# Patient Record
Sex: MALE | Race: BLACK OR AFRICAN AMERICAN | NOT HISPANIC OR LATINO | Employment: UNEMPLOYED | URBAN - METROPOLITAN AREA
[De-identification: names, ages, dates, MRNs, and addresses within clinical notes are randomized per-mention and may not be internally consistent; named-entity substitution may affect disease eponyms.]

---

## 2023-04-24 ENCOUNTER — HOSPITAL ENCOUNTER (EMERGENCY)
Facility: HOSPITAL | Age: 58
Discharge: HOME | End: 2023-04-24
Attending: EMERGENCY MEDICINE

## 2023-04-24 VITALS
TEMPERATURE: 99 F | BODY MASS INDEX: 25.18 KG/M2 | HEIGHT: 73 IN | OXYGEN SATURATION: 99 % | HEART RATE: 91 BPM | WEIGHT: 190 LBS | DIASTOLIC BLOOD PRESSURE: 85 MMHG | RESPIRATION RATE: 18 BRPM | SYSTOLIC BLOOD PRESSURE: 141 MMHG

## 2023-04-24 DIAGNOSIS — J02.0 STREP PHARYNGITIS: Primary | ICD-10-CM

## 2023-04-24 LAB — POC RAPID STREP A: POSITIVE

## 2023-04-24 PROCEDURE — 25000003 PHARM REV CODE 250: Mod: ER

## 2023-04-24 PROCEDURE — 96372 THER/PROPH/DIAG INJ SC/IM: CPT

## 2023-04-24 PROCEDURE — 99284 EMERGENCY DEPT VISIT MOD MDM: CPT | Mod: ER

## 2023-04-24 PROCEDURE — 63600175 PHARM REV CODE 636 W HCPCS: Mod: ER

## 2023-04-24 RX ORDER — DEXAMETHASONE SODIUM PHOSPHATE 4 MG/ML
4 INJECTION, SOLUTION INTRA-ARTICULAR; INTRALESIONAL; INTRAMUSCULAR; INTRAVENOUS; SOFT TISSUE
Status: COMPLETED | OUTPATIENT
Start: 2023-04-24 | End: 2023-04-24

## 2023-04-24 RX ORDER — IBUPROFEN 600 MG/1
600 TABLET ORAL EVERY 6 HOURS PRN
Qty: 20 TABLET | Refills: 0 | Status: SHIPPED | OUTPATIENT
Start: 2023-04-24

## 2023-04-24 RX ORDER — IBUPROFEN 600 MG/1
600 TABLET ORAL
Status: COMPLETED | OUTPATIENT
Start: 2023-04-24 | End: 2023-04-24

## 2023-04-24 RX ORDER — ACETAMINOPHEN 500 MG
500 TABLET ORAL EVERY 6 HOURS PRN
Qty: 28 TABLET | Refills: 0 | Status: SHIPPED | OUTPATIENT
Start: 2023-04-24 | End: 2023-05-01

## 2023-04-24 RX ORDER — PHENOL 1.4 %
AEROSOL, SPRAY (ML) MUCOUS MEMBRANE
Qty: 177 ML | Refills: 0 | Status: SHIPPED | OUTPATIENT
Start: 2023-04-24

## 2023-04-24 RX ORDER — PENICILLIN V POTASSIUM 250 MG/1
500 TABLET, FILM COATED ORAL
Status: COMPLETED | OUTPATIENT
Start: 2023-04-24 | End: 2023-04-24

## 2023-04-24 RX ORDER — PENICILLIN V POTASSIUM 500 MG/1
500 TABLET, FILM COATED ORAL 3 TIMES DAILY
Qty: 30 TABLET | Refills: 0 | Status: SHIPPED | OUTPATIENT
Start: 2023-04-24 | End: 2023-05-04

## 2023-04-24 RX ADMIN — PENICILLIN V POTASSIUM 500 MG: 250 TABLET, FILM COATED ORAL at 05:04

## 2023-04-24 RX ADMIN — IBUPROFEN 600 MG: 600 TABLET ORAL at 05:04

## 2023-04-24 RX ADMIN — DEXAMETHASONE SODIUM PHOSPHATE 4 MG: 4 INJECTION, SOLUTION INTRA-ARTICULAR; INTRALESIONAL; INTRAMUSCULAR; INTRAVENOUS; SOFT TISSUE at 05:04

## 2023-04-24 NOTE — ED PROVIDER NOTES
"Encounter Date: 4/24/2023    SCRIBE #1 NOTE: I, Nay Hinson, am scribing for, and in the presence of,  Henok Cerna PA-C. I have scribed the following portions of the note - Other sections scribed: HPI, ROS.     History     Chief Complaint   Patient presents with    Sore Throat     Sore throat x3 days with difficulty swallowing. Pt denies cough or congestion. Nyquil used with no relief.      This is a 57 y.o. male with no known PMHx who presents to the ED for a sore throat onset 3 days ago. He reports it is getting hard to swallow. Pt states "its hard to breathe at night". Pt reports attempting to treat his sore throat with Nyquil with no relief. Denies cough and congestion.    The history is provided by the patient. No  was used.   Review of patient's allergies indicates:  No Known Allergies  No past medical history on file.  No past surgical history on file.  No family history on file.  Social History     Tobacco Use    Smoking status: Never    Smokeless tobacco: Never   Substance Use Topics    Alcohol use: No    Drug use: No     Review of Systems   Constitutional:  Negative for diaphoresis, fatigue and unexpected weight change.   HENT:  Positive for sore throat. Negative for sinus pain.    Eyes:  Negative for pain, redness and visual disturbance.   Respiratory:  Negative for cough, chest tightness, shortness of breath and wheezing.    Cardiovascular:  Negative for chest pain and palpitations.   Gastrointestinal:  Negative for abdominal pain, blood in stool, diarrhea, nausea and vomiting.   Endocrine: Negative for polydipsia, polyphagia and polyuria.   Genitourinary:  Negative for dysuria, frequency and urgency.   Musculoskeletal:  Negative for arthralgias, back pain and myalgias.   Skin:  Negative for rash.   Allergic/Immunologic: Negative for environmental allergies.   Neurological:  Negative for dizziness, syncope and headaches.   Psychiatric/Behavioral:  Negative for suicidal ideas.  "     Physical Exam     Initial Vitals [04/24/23 1537]   BP Pulse Resp Temp SpO2   (!) 149/79 98 18 99.2 °F (37.3 °C) 99 %      MAP       --         Physical Exam    Nursing note and vitals reviewed.  Constitutional: Vital signs are normal. He appears well-developed and well-nourished. He is cooperative. He does not appear ill. No distress.   HENT:   Head: Normocephalic and atraumatic.   Right Ear: External ear normal.   Left Ear: External ear normal.   Nose: Nose normal.   Mouth/Throat: Uvula is midline. Mucous membranes are not dry. No dental abscesses or uvula swelling. Oropharyngeal exudate, posterior oropharyngeal edema and posterior oropharyngeal erythema present. No tonsillar abscesses.   Bilateral tonsils are swollen, erythematous, and with white exudates.  The right tonsil is swollen significantly more than the left tonsil.  There is no uvula deviation.  Airway is patent, patient is able to swallow and is managing secretions appropriately.   Eyes: Conjunctivae and EOM are normal.   Neck: Phonation normal.   Normal range of motion.  Cardiovascular:  Normal rate and regular rhythm.           No murmur heard.  Pulmonary/Chest: Effort normal. No respiratory distress.   Abdominal: Abdomen is flat. He exhibits no distension. There is no abdominal tenderness.   Musculoskeletal:      Cervical back: Normal range of motion.     Neurological: He is alert and oriented to person, place, and time. GCS eye subscore is 4. GCS verbal subscore is 5. GCS motor subscore is 6.   Skin: Skin is warm and dry. Capillary refill takes less than 2 seconds. No rash noted.       ED Course   Procedures  Labs Reviewed   POCT STREP A, RAPID - Abnormal; Notable for the following components:       Result Value    POC Rapid Strep A positive (*)     All other components within normal limits   POCT STREP A MOLECULAR          Imaging Results    None          Medications   penicillin v potassium tablet 500 mg (500 mg Oral Given 4/24/23 0994)    dexAMETHasone injection 4 mg (4 mg Intramuscular Given 4/24/23 1744)   ibuprofen tablet 600 mg (600 mg Oral Given 4/24/23 1744)     Medical Decision Making:   History:   Old Medical Records: I decided to obtain old medical records.  Initial Assessment:   57-year-old male presenting to the emergency department with chief complaint of sore throat.  Reports that it is becoming increasingly difficult to swallow secondary to pain and discomfort.  He has been able to eat and drink.  Denies drooling.  Denies fever.  On physical exam, clinically well-appearing and in no acute distress.  Respirations even and unlabored.  Managing secretions appropriately.  There is significant swelling, erythema, and exudates on bilateral tonsils.  Differential Diagnosis:   Differential diagnosis includes but is not limited to respiratory infections including strep pharyngitis, viral pharyngitis, peritonsillar abscess, retropharyngeal abscess, COVID, flu, bronchitis, rhinosinusitis, or pneumonia, or noninfectious processes such as asthma, COPD or seasonal allergies.   Clinical Tests:   Lab Tests: Ordered and Reviewed       <> Summary of Lab: Strep positive.  ED Management:  Patient presenting to the emergency department with a chief complaint of sore throat.  History and physical exam findings as above.  Patient tested positive for strep. Bilateral tonsils were erythematous, swollen, and had lacy white exudate.  This presentation is consistent with streptococcal pharyngitis.  The patient was given Tylenol in the emergency department. Patient noted some improvement in pain after this treatment.  First dose of oral antibiotics was provided in the emergency department.  The patient will be discharged home with a 10 day course of Penicillin-Vk, throat spray, and throat lozenges for both antimicrobial coverage and symptomatic relief.  The patient voiced their understanding that they need to take the antibiotic medications until all of the pills  are gone, even if symptoms improve or resolve.    Patient's right tonsil was significantly more swollen than the left tonsil, raising some mild concern for developing peritonsillar abscess.  However, the patient is managing secretions appropriately and is able to swallow.  No clear signs of current peritonsillar abscess, no punctum on erythematous and swollen tonsil.  With these findings, patient was safe to be discharged home and trialed with antibiotics rather than consulting ENT from the emergency department.  I did place an outpatient referral to ENT for management if this patient's symptoms do not improve.  Return precautions were discussed at length and in detail.    Return precautions were discussed, all patient questions were answered, and the patient was agreeable to the plan of care.  He was discharged home in stable condition and will follow up with his primary care provider or return to the emergency department if his symptoms worsen or do not improve.         Scribe Attestation:   Scribe #1: I performed the above scribed service and the documentation accurately describes the services I performed. I attest to the accuracy of the note.                 I, Henok Cerna PA-C, personally performed the services described in this documentation.  All medical record entries made by the scribe were at my direction and in my presence.  I have reviewed the chart and agree that the record reflects my personal performance and is accurate and complete.   Clinical Impression:   Final diagnoses:  [J02.0] Strep pharyngitis (Primary)        ED Disposition Condition    Discharge Stable          ED Prescriptions       Medication Sig Dispense Start Date End Date Auth. Provider    phenoL (CHLORASEPTIC THROAT SPRAY) 1.4 % SprA by Mucous Membrane route every 2 (two) hours as needed (Sore throat). 177 mL 4/24/2023 -- Henok Cerna PA-C    benzocaine-menthoL 6-10 mg lozenge Take 1 lozenge by mouth every 2 (two) hours as  needed. 36 tablet 4/24/2023 -- Henok Cerna PA-C    ibuprofen (ADVIL,MOTRIN) 600 MG tablet Take 1 tablet (600 mg total) by mouth every 6 (six) hours as needed for Pain. 20 tablet 4/24/2023 -- Henok Cerna PA-C    acetaminophen (TYLENOL) 500 MG tablet Take 1 tablet (500 mg total) by mouth every 6 (six) hours as needed. 28 tablet 4/24/2023 5/1/2023 Henok Cerna PA-C    penicillin v potassium (VEETID) 500 MG tablet Take 1 tablet (500 mg total) by mouth 3 (three) times daily. for 10 days 30 tablet 4/24/2023 5/4/2023 Henok Cerna PA-C          Follow-up Information       Follow up With Specialties Details Why Contact Info    [facility]  Schedule an appointment as soon as possible for a visit  As needed, If symptoms worsen [address] [place] [address]  [phone]      [facility] Emergency Medicine Go to  If symptoms worsen [address] [facility] [address] [phone]             Henok Cerna PA-C  04/24/23 1938

## 2023-04-24 NOTE — DISCHARGE INSTRUCTIONS

## 2023-12-06 ENCOUNTER — OFFICE VISIT (OUTPATIENT)
Dept: URGENT CARE | Facility: CLINIC | Age: 58
End: 2023-12-06

## 2023-12-06 VITALS
WEIGHT: 200 LBS | BODY MASS INDEX: 26.51 KG/M2 | SYSTOLIC BLOOD PRESSURE: 180 MMHG | TEMPERATURE: 99 F | RESPIRATION RATE: 16 BRPM | HEART RATE: 99 BPM | HEIGHT: 73 IN | DIASTOLIC BLOOD PRESSURE: 82 MMHG | OXYGEN SATURATION: 95 %

## 2023-12-06 DIAGNOSIS — N45.1 EPIDIDYMITIS: Primary | ICD-10-CM

## 2023-12-06 DIAGNOSIS — N50.89 TESTICULAR SWELLING: ICD-10-CM

## 2023-12-06 DIAGNOSIS — I10 HYPERTENSION, UNSPECIFIED TYPE: ICD-10-CM

## 2023-12-06 DIAGNOSIS — Z76.89 ENCOUNTER TO ESTABLISH CARE: ICD-10-CM

## 2023-12-06 PROCEDURE — 96372 PR INJECTION,THERAP/PROPH/DIAG2ST, IM OR SUBCUT: ICD-10-PCS | Mod: TIER,S$GLB,,

## 2023-12-06 PROCEDURE — 96372 THER/PROPH/DIAG INJ SC/IM: CPT | Mod: TIER,S$GLB,,

## 2023-12-06 PROCEDURE — 87591 N.GONORRHOEAE DNA AMP PROB: CPT

## 2023-12-06 PROCEDURE — 87491 CHLMYD TRACH DNA AMP PROBE: CPT

## 2023-12-06 PROCEDURE — 99203 OFFICE O/P NEW LOW 30 MIN: CPT | Mod: TIER,25,S$GLB,

## 2023-12-06 PROCEDURE — 99203 PR OFFICE/OUTPT VISIT, NEW, LEVL III, 30-44 MIN: ICD-10-PCS | Mod: TIER,25,S$GLB,

## 2023-12-06 RX ORDER — CEFTRIAXONE 500 MG/1
500 INJECTION, POWDER, FOR SOLUTION INTRAMUSCULAR; INTRAVENOUS
Status: COMPLETED | OUTPATIENT
Start: 2023-12-06 | End: 2023-12-06

## 2023-12-06 RX ORDER — DOXYCYCLINE 100 MG/1
100 CAPSULE ORAL 2 TIMES DAILY
Qty: 14 CAPSULE | Refills: 0 | Status: SHIPPED | OUTPATIENT
Start: 2023-12-06 | End: 2023-12-13

## 2023-12-06 RX ADMIN — CEFTRIAXONE 500 MG: 500 INJECTION, POWDER, FOR SOLUTION INTRAMUSCULAR; INTRAVENOUS at 02:12

## 2023-12-06 NOTE — PATIENT INSTRUCTIONS
Take oral antibiotic as directed for the next 7 days.  Take full dose or symptoms will not get better. Take with food and water to decrease GI upset. Try a probiotic or eat daily yogurt to maintain good gut and vaginal monty while on an antibiotic. Do not drink alcohol while taking an antibiotic.      Alternate tylenol and ibuprofen every 4 hours as needed for pain. Always take ibuprofen with food and water to avoid GI upset. Stop taking if you develop abdominal pain or blood in stool.     Monitor for worsening signs of infection such as redness, warmth, increase in pain, fevers, chills, body aches.     Follow up with urology. A referral was placed for you, call [phone] to schedule appointment.     Go to the ER if worsening signs/symptoms of infection occur.           Elevated BP  Check your BP twice per day and keep a log.      Try a DASH diet. I have attached information in your discharge paperwork to review. A Mediterranean diet will help to decrease inflammation and would help lower your BP. Increase water intake to help your body get rid of an increase in salt. Avoid alcohol as this can increase your BP. Increase aerobic activity to 3 times per week and 45 min at a time. Practice good sleep hygiene by sleeping in a dark, cool, comfortable bed. No tv/device use before bed or tv while sleeping. It is recommended to get 7-8 hours of uninterrupted sleep per night.     The recommended daily fluid intake for men is 3.7 liters (seven 16 oz bottles).    Follow up with a PCP for further management. A referral was placed for you, call [phone] to schedule appointment.     Go to the ER if your BP is consistently >160/100 and symptomatic with a severe headache, vomiting and unable to tolerate fluids, numbness/tingling/weakness to body, difficulty speaking, balance difficulty, confusion.          ([facility] recommendation)  The Mediterranean diet is based on plant-based foods and healthy fats.     You eat LOTS of  vegetables, fruits, beans, lentils, nuts, whole grains (wheat bread, brown rice) & extra virgin olive oil.   ---- These foods are high in fiber and antioxidants.   ---- These nutrients help reduce inflammation.   ---- Fiber helps regulate bowel movements.   ---- Antioxidants protect you against cancer.     Eat a moderate amount of fish (salmon, herring, mackerel, sardines, anchovies, halibut, rainbow trout, tuna)   ---- (fish are high in omega-3 fatty acids)    Eat a moderate amount of cheese and yogurt.    Eat little or no meat-- eat more poultry (baked chicken, grilled chicken, ground turkey)  ---- avoid red meat and pork (causes inflammation and linked to colon cancer)    Eat little or no sweats, sugary drinks or butter.     Limit your sodium intake. A diet high in salt can increase your blood pressure and predisposes you to a heart attack or stroke.     BENEFITS OF DIET  --- Lowers your risk of cardiovascular disease (heart attack, stroke) and many other diseases.   --- Supports a healthy body weight.   --- Supports a healthy blood sugar, blood pressure and cholesterol.   --- Lowers your risk of metabolic syndrome   --- Supports a heathy balance of good gut bacteria in your digestive system.   --- Lowers your risk for cancer.   --- Helps with brain function and slows decline as we age.   --- Helps you live longer.     Talk to your primary care doctor about a dietician referral for further guidance.

## 2023-12-06 NOTE — PROGRESS NOTES
"Subjective:      Patient ID: Mark Gill is a 58 y.o. male.    Vitals:  height is 6' 1" (1.854 m) and weight is 90.7 kg (200 lb). His oral temperature is 99.4 °F (37.4 °C). His blood pressure is 180/82 (abnormal) and his pulse is 99. His respiration is 16 and oxygen saturation is 95%.     Chief Complaint: Groin Swelling    Pt reports right testicular swelling x 10 days. He reports now having groin pain. Has not noticed any redness to area. Denies injury, fevers or penile discharge. BP is 180/82 today. Does not see primary care.     Penis Injury  The patient's primary symptoms include pelvic pain, scrotal swelling and testicular pain. The patient's pertinent negatives include no genital injury, genital itching, genital lesions, penile discharge, penile pain or priapism. This is a new problem. The current episode started 1 to 4 weeks ago (2 weeks). The problem occurs constantly. The problem has been waxing and waning. The pain is mild. Associated symptoms include headaches. Pertinent negatives include no abdominal pain, anorexia, chest pain, chills, constipation, coughing, diarrhea, discolored urine, dysuria, fever, flank pain, frequency, hematuria, hesitancy, joint pain, joint swelling, nausea, painful intercourse, rash, shortness of breath, sore throat, urgency, urinary retention or vomiting. The testicular pain affects the right testicle. There is swelling in the right testicle. The color of the testicles is Normal. The symptoms are aggravated by heavy lifting. He has tried a cold pack for the symptoms. The treatment provided mild relief. He is not sexually active. He never uses condoms. No, his partner does not have an STD. There is no history of BPH, chlamydia, cryptorchidism, erectile aid use, erectile dysfunction, a femoral hernia, gonorrhea, herpes simplex, HIV, an inguinal hernia, kidney stones, prostatitis, sickle cell disease, syphilis or varicocele.       Constitution: Negative for chills and fever. "   HENT:  Negative for sore throat.    Cardiovascular:  Negative for chest pain.   Respiratory:  Negative for cough and shortness of breath.    Gastrointestinal:  Negative for abdominal pain, nausea, vomiting, constipation and diarrhea.   Genitourinary:  Positive for scrotal swelling, testicular pain and pelvic pain. Negative for dysuria, frequency, urgency, flank pain, penile discharge, penile pain and penile swelling.   Skin:  Negative for rash.   Neurological:  Positive for headaches.      Objective:     Physical Exam   Constitutional: He is oriented to person, place, and time. He appears well-developed.   HENT:   Head: Normocephalic and atraumatic.   Ears:   Right Ear: External ear normal.   Left Ear: External ear normal.   Nose: Nose normal.   Mouth/Throat: Mucous membranes are normal.   Eyes: Conjunctivae and lids are normal.   Neck: Trachea normal. Neck supple.   Cardiovascular: Normal rate, regular rhythm and normal heart sounds.   Pulmonary/Chest: Effort normal and breath sounds normal. No respiratory distress.   Abdominal: Normal appearance and bowel sounds are normal. He exhibits no distension and no mass. Soft. There is no abdominal tenderness.   Genitourinary:         Comments: deferred     Musculoskeletal: Normal range of motion.         General: Normal range of motion.   Neurological: He is alert and oriented to person, place, and time. He has normal strength.   Skin: Skin is warm, dry, intact, not diaphoretic and not pale.   Psychiatric: His speech is normal and behavior is normal. Judgment and thought content normal.   Nursing note and vitals reviewed.      Assessment:     1. Epididymitis    2. Testicular swelling    3. Hypertension, unspecified type    4. Encounter to establish care        Plan:       Epididymitis  -     cefTRIAXone injection 500 mg  -     doxycycline (VIBRAMYCIN) 100 MG Cap; Take 1 capsule (100 mg total) by mouth 2 (two) times daily. for 7 days  Dispense: 14 capsule; Refill:  0    Testicular swelling  -     C. trachomatis/N. gonorrhoeae by AMP DNA [facility]; Urine  -     Ambulatory referral/consult to Urology    Hypertension, unspecified type    Encounter to establish care  -     Ambulatory referral/consult to Family Practice            Discussed results/diagnosis/plan with patient in clinic. Strict precautions given to patient to monitor for worsening signs and symptoms. Advised to follow up with PCP or specialist.  Explained side effects of medications prescribed with patient and informed him/her to discontinue use if he/she has any side effects and to inform UC or PCP if this occurs. All questions answered. Strict ED verses clinic return precautions stressed and given in depth. Advised if symptoms worsens of fail to improve he/she should go to the Emergency Room. Discharge and follow-up instructions given verbally/printed with the patient who expressed understanding and willingness to comply with my recommendations. Patient voiced understanding and in agreement with current treatment plan. Patient exits the exam room in no acute distress. Conversant and engaged during discharge discussion, verbalized understanding.

## 2023-12-11 ENCOUNTER — TELEPHONE (OUTPATIENT)
Dept: URGENT CARE | Facility: CLINIC | Age: 58
End: 2023-12-11

## 2023-12-11 NOTE — TELEPHONE ENCOUNTER
I saw patient on 12/6 and treated for epididymitis. G/C urine did not result. I called patient to notify and he reports symptoms have resolved. I referred him to urology and PCP but did not follow up. Advised to make an appt if needed.